# Patient Record
Sex: FEMALE | Race: WHITE | Employment: OTHER | URBAN - METROPOLITAN AREA
[De-identification: names, ages, dates, MRNs, and addresses within clinical notes are randomized per-mention and may not be internally consistent; named-entity substitution may affect disease eponyms.]

---

## 2017-01-26 ENCOUNTER — LAB CONVERSION - ENCOUNTER (OUTPATIENT)
Dept: OTHER | Facility: OTHER | Age: 72
End: 2017-01-26

## 2017-01-26 LAB — HBA1C MFR BLD HPLC: 6.6 % OF TOTAL HGB

## 2017-01-30 ENCOUNTER — ALLSCRIPTS OFFICE VISIT (OUTPATIENT)
Dept: OTHER | Facility: OTHER | Age: 72
End: 2017-01-30

## 2017-04-18 ENCOUNTER — GENERIC CONVERSION - ENCOUNTER (OUTPATIENT)
Dept: OTHER | Facility: OTHER | Age: 72
End: 2017-04-18

## 2018-01-10 NOTE — MISCELLANEOUS
Message   Recorded as Task   Date: 2017 11:43 AM, Created By: Jose Alford   Task Name: Follow Up   Assigned To: Tanvi Odonnell   Regarding Patient: Roosevelt Stokes, Status: Active   CommentChristina Lowery - 2017 11:43 AM     TASK CREATED  Caller: Liban Wu  DEATH CERTIFICATE FOR PT IN RED FOLDER ON YOUR DESK  Tanvi Odonnell - 2017 5:33 PM     TASK REPLIED TO: Previously Assigned To Tanvi Odonnell  Please call  home and find out what happened  Patient was very stable and I have no idea what happened or who pronounced her  It is not on the death certificate  I'm not filling anything out until I know this  Jose Alford - 2017 9:38 AM     TASK EDITED  the  home called  [facility] pronounced pt  their # is [phone]  if dr Alexys Blum does not feel comfortable filling out the certificate perhaps the 's office could do it  Gurwinder Patton # [phone]        Signatures   Electronically signed by : Neeraj Ashby DO;  2017  2:11PM EST                       (Author)

## 2018-01-12 NOTE — MISCELLANEOUS
Message   Recorded as Task   Date: 2017 11:43 AM, Created By: Kim Mcdonald   Task Name: Follow Up   Assigned To: Tanvi Odonnell   Regarding Patient: Bebo Chavez, Status: Active   [facility] - 2017 11:43 AM     TASK CREATED  Caller: Liban Wu  DEATH CERTIFICATE FOR PT IN RED FOLDER ON YOUR DESK  Tanvi Odonnell - 2017 5:33 PM     TASK REPLIED TO: Previously Assigned To Tanvi Odonnell  Please call  home and find out what happened  Patient was very stable and I have no idea what happened or who pronounced her  It is not on the death certificate  I'm not filling anything out until I know this  Kim Mcdonald - 2017 9:38 AM     TASK EDITED  the  home called  [facility] pronounced pt  their # is [phone]  if dr Lisa Santoyo does not feel comfortable filling out the certificate perhaps the 's office could do it  Coeur D Alene Eng # [phone]        Signatures   Electronically signed by : Jesus Cano DO;  2017  2:12PM EST                       (Author)

## 2018-01-13 VITALS
RESPIRATION RATE: 18 BRPM | DIASTOLIC BLOOD PRESSURE: 80 MMHG | HEART RATE: 88 BPM | WEIGHT: 229.4 LBS | HEIGHT: 68 IN | BODY MASS INDEX: 34.77 KG/M2 | SYSTOLIC BLOOD PRESSURE: 170 MMHG

## 2018-01-15 NOTE — PROGRESS NOTES
Assessment   1  Acid reflux (530 81) (K21 9)  2  Allergic rhinitis (477 9) (J30 9)  3  Atrial fibrillation (427 31) (I48 91)  4  CAD (coronary atherosclerotic disease) (414 00) (I25 10)   · 01/06/2012  5  Chronic obstructive pulmonary disease (496) (J44 9)  6  Urine, incontinence, stress female (625 6) (N39 3)  7  Hyperlipidemia (272 4) (E78 5)   · 01/06/2012  8  Hypertension (401 9) (I10)  9  Peripheral vascular disease (443 9) (I73 9)   · 01/06/2012  10  Type 2 diabetes mellitus (250 00) (E11 9)  11  Vitamin D deficiency (268 9) (E55 9)   · 01/06/2012    Plan  Chronic obstructive pulmonary disease    · Stop: Spiriva HandiHaler 18 MCG Inhalation Capsule   · Start: Incruse Ellipta 62 5 MCG/INH Inhalation Aerosol Powder Breath Activated; INHALE  1 PUFF DAILY   · PEAK FLOW- POC; Status:Complete;   Done: 04MBK1144 08:34AM  Hypertension    · Renew: Diltiazem HCl ER Coated Beads 240 MG Oral Capsule Extended Release 24  Hour (Cardizem CD); TAKE ONE CAPSULE BY MOUTH EVERY DAY   · Renew: Lisinopril 40 MG Oral Tablet; take one tablet by mouth every day  Screening for genitourinary condition    · *VB-Urinary Incontinence Screen (Dx V81 6 Screen for UI); Status:Complete;   Done:  54ZDC2278 08:25AM  Screening for neurological condition    · *VB - Fall Risk Assessment  (Dx V80 09 Screen for Neurologic Disorder);  Status:Complete;   Done: 51BJP6775 08:25AM  Type 2 diabetes mellitus    · Renew: Glimepiride 4 MG Oral Tablet; TAKE TWO TABLETS BY MOUTH IN THE  MORNING WITH BREAKFAST   · (1) HEMOGLOBIN A1C; Status:Active; Requested for:22Apr2016;     #1 despite patient's last 3 months her diabetes is amazing at 6 8 down from 7 0  She has also lost 7 pounds!   She will continue metformin 2 pills with breakfast and 2 pills with her evening meal  She will continue to take the glimepiride 2 pills in the morning with breakfast with her to metformin      #2 A  fib was cardio converted into regular sinus rhythm and is now on Coumadin as well as metoprolol   Patient follows with Doctor [address]  #3 hypertension excellent today    She is on diltiazem, doxazosin, metoprolol and HCTZ for her blood pressure  #4 vitamin D  deficiency continue vitamin D 5000 units daily     #5 COPD  Patient takes a nebulizer which contains both albuterol and budesonide in the morning followed by Spiriva  Patient's peak flows are the same at 250  Once patient runs out of Spiriva she will take the Incruse Ellipta   Patient will take albuterol budesonide one other time during the day  Next PFT around October 2016    #6 takes half a lovastatin now for her cholesterol    #7 reflux on omeprazole-patient stopped taking it for a couple days and has been fine and therefore she is going to take it only as needed    #8 coronary artery disease  Patient on last catheterization had a 50% occlusion of her LAD and a 75% of one of her circumflex  Is on metoprolol, Multaq, diltiazem, and warfarin for Doctor Gallo Uribe in 3 months with an A1c     Patient does not want any colonoscopies   Patient's mammogram on her after May 20, 2016  Pap test currently deferred       We'll recheck as needed otherwise     Discussion/Summary    When I asked about patient's incontinence she told me that suddenly just disappeared and she is totally fine! 1        1 Amended By: Eileen Soto; Jan 26 2016 11:35 AM EST    Chief Complaint  pt here for 3 month recheck, a-fib, cad,copd, hyperlipidemia, htn, dm, vit d def    mammo due 05/20/2016  colonoscopy deferred idm updated  pap deferred idem updated   foot exam due 07/2016  eye exam due  awv due 07/23/2016  ekg due 04/29/2016      History of Present Illness  Patient is here for three-month recheck right since last visit patient's  was sick and finally diagnosed with lung cancer  She is always had some issues with him and now he is acting quite helpless and wants everything to be done for him   It has put her in a rough spot as there is no way she is going to do this because then he only demands more  She is trying to take him to his appointments and get him treated as best as she can  She is worried about her diabetes and her weight as a result of all the stress of the last 3 months  She also has a letter from her healthcare organization about a change of medications  Review of Systems  Constitutional  No fever chills no fatigue no weight loss no weight gain    Mental status  No anxiety or depression and no sleep disturbances no changes in personality or emotional problems no suicidal or homicidal ideations    Eyes  No eye pain no red eyes no visual disturbances no discharge no eye itch    ENT  No earache no hearing loss nasal discharge no sore throat no hoarseness no postnasal drip     Cardio  No chest pain no palpitations no leg edema no claudication no dyspnea on exertion no nocturnal dyspnea    Respiratory  No shortness of breath or wheeze no cough no orthopnea no dyspnea on exertion no hemoptysis no sputum production    GI  No abdominal pain no nausea no vomiting no diarrhea or constipation no bloody stools no change in bowel habits no change in weight      no dysuria hematuria no pyuria no incontinence no pelvic pain    Musculoskeletal  No myalgias arthralgias no joint swelling or stiffness no limb pain or swelling    Skin  No rashes or lesions no itchiness and no wounds    Neuro  No headache dizziness lightheadedness syncope numbness paresthesias or confusion    Heme  No swollen glands no easy bruising    Older:1    The patient currently has no urinary incontinence symptoms1   1   1  1        1 Amended By: Sapphire De La Cruz; Jan 26 2016 11:34 AM EST    Active Problems   1  Acid reflux (530 81) (K21 9)  2  Allergic rhinitis (477 9) (J30 9)  3  Atrial fibrillation (427 31) (I48 91)  4  CAD (coronary atherosclerotic disease) (414 00) (I25 10)  5  Chronic obstructive pulmonary disease (496) (J44 9)  6  Colonoscopy (Fiberoptic) Screening  7   Corns (700) (L84)  8  Encounter for Medicare annual wellness exam (V70 0) (Z00 00)  9  Encounter for screening mammogram for malignant neoplasm of breast (V76 12)   (Z12 31)  10  Encounter for therapeutic drug monitoring (V58 83) (Z51 81)  11  Fatigue (780 79) (R53 83)  12  Hyperlipidemia (272 4) (E78 5)  13  Hypertension (401 9) (I10)  14  Long term use of drug (V58 69) (Z79 899)  15  Lumbar disc disorder with myelopathy (722 73) (M51 06)  16  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)  17  History of Need for chickenpox vaccination (V05 4) (Z23)  18  History of Need for pneumococcal vaccination (V03 82) (Z23)  19  Need for prophylactic vaccination and inoculation against influenza (V04 81) (Z23)  20  Peripheral vascular disease (443 9) (I73 9)  21  Persistent sinus bradycardia (427 81) (R00 1)  22  History of Pneumonia (V12 61)  23  Screening for genitourinary condition (V81 6) (Z13 89)  24  Screening for malignant neoplasm of respiratory organ (V76 0) (Z12 2)  25  Screening for neurological condition (V80 09) (Z13 89)  26  Tinea pedis (110 4) (B35 3)  27  Type 2 diabetes mellitus (250 00) (E11 9)  28  Urine, incontinence, stress female (625 6) (N39 3)  29  Vitamin D deficiency (268 9) (E55 9)  30  Wax in ear (380 4) (H61 20)    Past Medical History   1  History of Acute pain of right hip (719 45) (M25 551)  2  History of Asthma with acute exacerbation (493 92) (J45 901)  3  History of Back spasm (724 8) (M62 830)  4  History of Emphysema (492 8) (J43 9)  5  History of Encounter for routine gynecological examination (V72 31) (Z01 419)  6  History of Encounter for routine gynecological examination (V72 31) (Z01 419)  7  History of Encounter for routine gynecological examination (V72 31) (Z01 419)  8  History of Glaucoma Screening  9  History of Hip pain, unspecified laterality  10  History of low back pain (V13 59) (Z87 39)  11  History of Need for chickenpox vaccination (V05 4) (Z23)  12   History of Need for pneumococcal vaccination (V03 82) (Z23)  13  Need for prophylactic vaccination and inoculation against influenza (V04 81) (Z23)  14  History of Pain of upper extremity, unspecified laterality (729 5) (M79 603)  15  History of Pneumonia (V12 61)  16  History of Screening for glaucoma (V80 1) (Z13 5)  17  History of TSH elevation (794 5) (R94 6)    Surgical History   1  History of Appendectomy  2  History of Cath Stent Placement  3  History of Cholecystectomy  4  History of Coronary Artery Surgery  5  History of Diagnostic Esophagogastroduodenoscopy    Family History   1  Family history of Heart Disease (V17 49)  2  Family history of Mother  At Age 80  3  Family history of Skin Cancer (V16 8)  4  Family history of Stroke Syndrome (V17 1)   5  Family history of Emphysema  6  Family history of Father  At Age 80  7  Family history of Heart Disease (V17 49)   8  Family history of Cancer    The family history was reviewed and updated today  Social History    · Current every day smoker (305 1) (F17 200)   · Drinks coffee   · Has smoke detectors   · No alcohol use   · No drug use   · Uses Safety Equipment - Seatbelts  The social history was reviewed and is unchanged  Patient still smokes with the stress of the situation        Current Meds  1  Albuterol Sulfate (2 5 MG/3ML) 0 083% Inhalation Nebulization Solution; USE ONE VIAL   IN NEBULIZER 4 TIMES DAILY; Therapy: 2012 to (Evaluate:2016)  Requested for: 45Fpz0168; Last   Rx:67Diy9046 Ordered  2  Aspirin 81 MG Oral Tablet; TAKE 1 TABLET DAILY; Therapy: (Mela Guerrero) to Recorded  3  Budesonide 0 5 MG/2ML Inhalation Suspension; USE ONE VIAL IN NEBULIZER TWICE   DAILY; Therapy: 28YIV2802 to (Alanna Del Angel)  Requested for: 24ZUS9366; Last   Rx:06Sro4220 Ordered  4  Diltiazem HCl ER Coated Beads 240 MG Oral Capsule Extended Release 24 Hour;   TAKE ONE CAPSULE BY MOUTH EVERY DAY;    Therapy: 33GUO2435 to (Evaluate:2016)  Requested for: 67Zby1056; Last   Rx:36Tyo7804 Ordered  5  Doxazosin Mesylate 2 MG Oral Tablet; take one tablet by mouth daily; Therapy: 22ATW2013 to (Hayley Srivastava)  Requested for: 84PLQ6678; Last   VV:57RCW7485 Ordered  6  Fish Oil 1000 MG Oral Capsule; Therapy: (Neda Garcia) to Recorded  7  Glimepiride 4 MG Oral Tablet; TAKE TWO TABLETS BY MOUTH IN THE MORNING WITH   BREAKFAST; Therapy: 50EHW4530 to (Evaluate:08Jan2016)  Requested for: 23Cxv8649; Last   Rx:94Rtv9586 Ordered  8  Hydrochlorothiazide 25 MG Oral Tablet; TAKE 1 TABLET DAILY  Requested for:   79UNM1334; Last HJ:26HEK8582 Ordered  9  Lisinopril 40 MG Oral Tablet; take one tablet by mouth every day; Therapy: 77TLW2929 to (Evaluate:08Jan2016)  Requested for: 68Beu9048; Last   Rx:51Vyt1997 Ordered  10  Lovastatin 40 MG Oral Tablet; TAKE HALF TABLET BY MOUTH DAILY WITH EVENING    MEAL; Therapy: 79LKE8633 to (Last LM:50QYB4528)  Requested for: 91NAQ8108 Ordered  11  MetFORMIN HCl ER (OSM) 500 MG Oral Tablet Extended Release 24 Hour; 2 tablets with    breakfast and 2 tablets with evening meal;    Therapy: 14MVR4794 to (Last Rx:59Xsn5022)  Requested for: 90Bbf3913 Ordered  12  Metoprolol Tartrate 100 MG Oral Tablet; TAKE 1 TABLET TWICE DAILY; Therapy: 61KNU7678 to (Evaluate:31Jan2016)  Requested for: 26Vmo1242; Last    Rx:79Xus3889 Ordered  13  Orphenadrine Citrate  MG Oral Tablet Extended Release 12 Hour; One pill twice a    day only as needed for upper arm pain and muscle spasm; Therapy: 02MDJ1609 to (Last Rx:26Oct2015)  Requested for: 26Oct2015 Ordered  14  PriLOSEC OTC 20 MG Oral Tablet Delayed Release; TAKE 1 TABLET DAILY as needed    ONLY; Therapy: 03PAD6146 to Recorded  15  Spiriva HandiHaler 18 MCG Inhalation Capsule; INHALE CONTENTS OF 1 CAPSULE    ONCE DAILY; Therapy: 04JYE1629 to (Evaluate:01Jfp8694)  Requested for: 444 14 907; Last    Rx:26Oct2015 Ordered  16   TraMADol HCl - 50 MG Oral Tablet; TAKE 1 TABLET 4 TIMES DAILY AS NEEDED FOR    PAIN;    Therapy: 50YIG1616 to (Evaluate:37Txa4002)  Requested for: 92VXW8247 Recorded  17  Vitamin C TABS; TAKE 1 TABLET DAILY; Therapy: (Recorded:75Dui6494) to Recorded  18  Vitamin D3 5000 UNIT Oral Capsule; TAKE AS DIRECTED; Therapy: (771 098 540) to Recorded  19  Warfarin Sodium 10 MG Oral Tablet; TAKE 1/2  to 1  TABLET DAILY BY  MOUTH OR  AS    DIRECTED  Requested for: 30Apr2015; Last Rx:30Apr2015 Ordered    Allergies   1  Penicillins   2   Seasonal    Vitals  Vital Signs [Data Includes: Current Encounter]    Recorded: 83FIR5118 08:25AM   Heart Rate 84   Respiration 20   Systolic 556, LUE, Sitting   Diastolic 80, LUE, Sitting   BP Cuff Size Large   Height 5 ft 7 25 in   Weight 234 lb    BMI Calculated 36 38   BSA Calculated 2 17     Physical Exam  Constitutional  Appears healthy, Looks well, Appearance consistent with age    Mental Status  Alert, Oriented, Cooperative, Memory function normal , clean    Neck  No neck mass, No thyromegaly, Good carotid upstrokes bilaterally, trachea midline positive click    Respiratory  Breath sounds normal, No rales, No rhonchi, No wheezing, normal palpation    Cardiac   Regular rhythm without ectopy or murmur no S3-S4, no heave lift or thrill to palpation    Vascular  No leg edema, No pedal edema    Muscular skeletal  No clubbing cyanosis , muscle tone normal    Skin  No appreciable rashes or lesions        Results/Data  Encounter Results   PEAK FLOW- POC 62PLR3216 08:34AM Achilles Iba     Test Name Result Flag Reference   Peak Flow 250/250/260       *VB - Fall Risk Assessment  (Dx V80 09 Screen for Neurologic Disorder) 19PXV7653 08:25AM Achilles Iba     Test Name Result Flag Reference   Fall Risk Assessment 70IDR9681       *VB-Urinary Incontinence Screen (Dx V81 6 Screen for UI) 20MDB9783 08:25AM Achilles Iba     Test Name Result Flag Reference   Urinary Incontinence Assessment 89EKP1595       Results   PEAK FLOW- POC 46TIE9643 08:34AM Achilles Iba Test Name Result Flag Reference   Peak Flow 250/250/260       (Q) HEMOGLOBIN A1c 22Jan2016 07:15AM Radha Walker   REPORT COMMENT:  FASTING:YES     Test Name Result Flag Reference   HEMOGLOBIN A1c 6 8 % of total Hgb H <5 7   According to ADA guidelines, hemoglobin A1c <7 0%  represents optimal control in non-pregnant diabetic  patients  Different metrics may apply to specific  patient populations  Standards of Medical Care in  [address] [place] [address]  Diabetes Care  2013;36:s11-s66     For the purpose of screening for the presence of  diabetes  <5 7%       Consistent with the absence of diabetes  5 7-6 4%    Consistent with increased risk for diabetes              (prediabetes)  >or=6 5%    Consistent with diabetes     This assay result is consistent with diabetes  mellitus  Currently, no consensus exists for use of hemoglobin  A1c for diagnosis of diabetes for children  PEAK FLOW- POC 13FSD8604 08:10AM Radha Walker     Test Name Result Flag Reference   Peak Flow 250, 250, 250       (Q) HEMOGLOBIN A1c 20Oct2015 07:02AM Neriskaylynn Aaron   REPORT COMMENT:  FASTING:YES     Test Name Result Flag Reference   HEMOGLOBIN A1c 7 0 % of total Hgb H <5 7   According to ADA guidelines, hemoglobin A1c <7 0%  represents optimal control in non-pregnant diabetic  patients  Different metrics may apply to specific  patient populations  Standards of Medical Care in  [address] [place] [address]  Diabetes Care  2013;36:s11-s66     For the purpose of screening for the presence of  diabetes  <5 7%       Consistent with the absence of diabetes  5 7-6 4%    Consistent with increased risk for diabetes              (prediabetes)  >or=6 5%    Consistent with diabetes     This assay result is consistent with diabetes  mellitus  Currently, no consensus exists for use of hemoglobin  A1c for diagnosis of diabetes for children       Health Management  Encounter for screening mammogram for malignant neoplasm of breast   Digital Bilateral Screening Mammogram With CAD; every 1 year; Last 31SZU9732; Next Due:  H6800016; Active  Hypertension   EKG/ECG- POC; every 1 year; Last 42Krf8675; Next Due: 37ULC1102; Active  Health Maintenance   Medicare Annual Wellness Visit; every 1 year; Last 43Rfx2370; Next Due: 84LYT0448;  Active    Signatures   Electronically signed by : John Bobo DO; Jan 26 2016  9:03AM EST                       (Author)    Electronically signed by : John Bobo DO; Jan 26 2016 11:35AM EST                       (Author)